# Patient Record
Sex: FEMALE | Race: OTHER | HISPANIC OR LATINO
[De-identification: names, ages, dates, MRNs, and addresses within clinical notes are randomized per-mention and may not be internally consistent; named-entity substitution may affect disease eponyms.]

---

## 2017-06-01 ENCOUNTER — RESULT REVIEW (OUTPATIENT)
Age: 42
End: 2017-06-01

## 2017-06-01 ENCOUNTER — APPOINTMENT (OUTPATIENT)
Dept: FAMILY MEDICINE | Facility: HOSPITAL | Age: 42
End: 2017-06-01

## 2017-06-01 ENCOUNTER — OUTPATIENT (OUTPATIENT)
Dept: OUTPATIENT SERVICES | Facility: HOSPITAL | Age: 42
LOS: 1 days | End: 2017-06-01
Payer: SELF-PAY

## 2017-06-01 VITALS
HEIGHT: 60 IN | TEMPERATURE: 97.8 F | DIASTOLIC BLOOD PRESSURE: 87 MMHG | RESPIRATION RATE: 14 BRPM | BODY MASS INDEX: 29.06 KG/M2 | OXYGEN SATURATION: 100 % | WEIGHT: 148 LBS | SYSTOLIC BLOOD PRESSURE: 129 MMHG | HEART RATE: 77 BPM

## 2017-06-01 PROCEDURE — 88175 CYTOPATH C/V AUTO FLUID REDO: CPT

## 2017-06-01 PROCEDURE — G0463: CPT

## 2017-06-01 PROCEDURE — 87624 HPV HI-RISK TYP POOLED RSLT: CPT

## 2017-06-23 LAB
CYTOLOGY CVX/VAG DOC THIN PREP: NORMAL
HPV I/H RISK 3 DNA ANAL QL PROBE+SIG AMP: NORMAL

## 2019-03-13 ENCOUNTER — OUTPATIENT (OUTPATIENT)
Dept: OUTPATIENT SERVICES | Facility: HOSPITAL | Age: 44
LOS: 1 days | End: 2019-03-13
Payer: SELF-PAY

## 2019-03-13 ENCOUNTER — APPOINTMENT (OUTPATIENT)
Age: 44
End: 2019-03-13

## 2019-03-13 VITALS
WEIGHT: 148 LBS | OXYGEN SATURATION: 98 % | RESPIRATION RATE: 14 BRPM | SYSTOLIC BLOOD PRESSURE: 116 MMHG | BODY MASS INDEX: 28.9 KG/M2 | DIASTOLIC BLOOD PRESSURE: 82 MMHG | HEART RATE: 72 BPM | TEMPERATURE: 97.7 F

## 2019-03-13 DIAGNOSIS — Z82.49 FAMILY HISTORY OF ISCHEMIC HEART DISEASE AND OTHER DISEASES OF THE CIRCULATORY SYSTEM: ICD-10-CM

## 2019-03-13 DIAGNOSIS — Z00.00 ENCOUNTER FOR GENERAL ADULT MEDICAL EXAMINATION WITHOUT ABNORMAL FINDINGS: ICD-10-CM

## 2019-03-13 PROCEDURE — 80053 COMPREHEN METABOLIC PANEL: CPT

## 2019-03-13 PROCEDURE — G0463: CPT

## 2019-03-13 PROCEDURE — 83036 HEMOGLOBIN GLYCOSYLATED A1C: CPT

## 2019-03-13 PROCEDURE — 90471 IMMUNIZATION ADMIN: CPT

## 2019-03-13 PROCEDURE — 36415 COLL VENOUS BLD VENIPUNCTURE: CPT

## 2019-03-13 PROCEDURE — 87389 HIV-1 AG W/HIV-1&-2 AB AG IA: CPT

## 2019-03-13 NOTE — COUNSELING
[Weight management counseling provided] : Weight management [Healthy eating counseling provided] : healthy eating [Activity counseling provided] : activity [Low Fat Diet] : Low fat diet [Decrease Portions] : Decrease food portions [Keep Food Diary] : Keep food diary [___ min/wk activity recommended] : [unfilled] min/wk activity recommended [Walking] : Walking

## 2019-03-14 DIAGNOSIS — R73.09 OTHER ABNORMAL GLUCOSE: ICD-10-CM

## 2019-03-14 DIAGNOSIS — Z11.4 ENCOUNTER FOR SCREENING FOR HUMAN IMMUNODEFICIENCY VIRUS [HIV]: ICD-10-CM

## 2019-03-14 DIAGNOSIS — E66.3 OVERWEIGHT: ICD-10-CM

## 2019-03-14 DIAGNOSIS — Z23 ENCOUNTER FOR IMMUNIZATION: ICD-10-CM

## 2019-03-14 LAB
ALBUMIN SERPL ELPH-MCNC: 4.4 G/DL
ALP BLD-CCNC: 87 U/L
ALT SERPL-CCNC: 14 U/L
ANION GAP SERPL CALC-SCNC: 11 MMOL/L
AST SERPL-CCNC: 15 U/L
BILIRUB SERPL-MCNC: <0.2 MG/DL
BUN SERPL-MCNC: 13 MG/DL
CALCIUM SERPL-MCNC: 10 MG/DL
CHLORIDE SERPL-SCNC: 105 MMOL/L
CO2 SERPL-SCNC: 25 MMOL/L
CREAT SERPL-MCNC: 0.7 MG/DL
GLUCOSE SERPL-MCNC: 96 MG/DL
HBA1C MFR BLD HPLC: 5.6 %
HIV1+2 AB SPEC QL IA.RAPID: NONREACTIVE
POTASSIUM SERPL-SCNC: 4.5 MMOL/L
PROT SERPL-MCNC: 7.5 G/DL
SODIUM SERPL-SCNC: 141 MMOL/L

## 2019-03-14 NOTE — PHYSICAL EXAM
[No Acute Distress] : no acute distress [Well Nourished] : well nourished [Well Developed] : well developed [Well-Appearing] : well-appearing [Normal Sclera/Conjunctiva] : normal sclera/conjunctiva [PERRL] : pupils equal round and reactive to light [EOMI] : extraocular movements intact [Normal Outer Ear/Nose] : the outer ears and nose were normal in appearance [No JVD] : no jugular venous distention [Supple] : supple [No Lymphadenopathy] : no lymphadenopathy [Thyroid Normal, No Nodules] : the thyroid was normal and there were no nodules present [No Respiratory Distress] : no respiratory distress  [Clear to Auscultation] : lungs were clear to auscultation bilaterally [No Accessory Muscle Use] : no accessory muscle use [Normal Rate] : normal rate  [Regular Rhythm] : with a regular rhythm [Normal S1, S2] : normal S1 and S2 [No Murmur] : no murmur heard [Pedal Pulses Present] : the pedal pulses are present [No Edema] : there was no peripheral edema [No Extremity Clubbing/Cyanosis] : no extremity clubbing/cyanosis [No Palpable Aorta] : no palpable aorta [Soft] : abdomen soft [Non-distended] : non-distended [No Masses] : no abdominal mass palpated [No HSM] : no HSM [Normal Bowel Sounds] : normal bowel sounds [Normal Posterior Cervical Nodes] : no posterior cervical lymphadenopathy [Normal Anterior Cervical Nodes] : no anterior cervical lymphadenopathy [No CVA Tenderness] : no CVA  tenderness [No Spinal Tenderness] : no spinal tenderness [No Joint Swelling] : no joint swelling [Grossly Normal Strength/Tone] : grossly normal strength/tone [No Rash] : no rash [Normal Gait] : normal gait [Coordination Grossly Intact] : coordination grossly intact [No Focal Deficits] : no focal deficits [Deep Tendon Reflexes (DTR)] : deep tendon reflexes were 2+ and symmetric [Normal Affect] : the affect was normal [Normal Insight/Judgement] : insight and judgment were intact [Normal TMs] : both tympanic membranes were normal [Normal Nasal Mucosa] : the nasal mucosa was normal [de-identified] : metallic plates on teeth [de-identified] : tenderness to palpation of LUQ/LLQ

## 2019-03-14 NOTE — HISTORY OF PRESENT ILLNESS
[[employer] ] : provided by [employer]   [FreeTextEntry1] : [id number] [FreeTextEntry2] : Mauro [de-identified] : 44 yo F here for CPE. LMP 3/7/19. She describes having lower abdominal/pelvic pain with her periods, for which she takes Advil for. Pt notes that at times she experiences this pain in between cycles. Pt also mentions  that she has had some intermenstrual bleeding recently.\par \par FAMILY Hx: Mother has some history of heart disease but no history of MI (as previously documented).\par \par IMM: Flu and Tdap vaccines offered. Pt refused flu vaccine but would like Tdap today.\par SCREENINGS: Last Pap smear in 2017 was unsatisfactory/- HPV. Pt has not had repeat or follow up Pap since. HIV screening test offered, pt accepts. \par ORAL HEALTH: Pt has not seen a dentist within the past year.

## 2019-03-14 NOTE — REVIEW OF SYSTEMS
[Abdominal Pain] : abdominal pain [Negative] : Heme/Lymph [Fever] : no fever [Chills] : no chills [Night Sweats] : no night sweats [Chest Pain] : no chest pain [Palpitations] : no palpitations [Shortness Of Breath] : no shortness of breath [Cough] : no cough [Nausea] : no nausea [Vomiting] : no vomiting [Dysuria] : no dysuria [Hematuria] : no hematuria [Headache] : no headache [Dizziness] : no dizziness [Unsteady Walking] : no ataxia [FreeTextEntry7] : lower abdominal pain [FreeTextEntry8] : intermenstrual bleeding [FreeTextEntry9] : right hip pain when standing

## 2019-03-14 NOTE — ASSESSMENT
[FreeTextEntry1] : 42 yo overweight F here for CPE. ROS is positive for pelvic pain/intermenstrual bleeding. Pt needs repeat Pap as last Pap in 2017 was unsatisfactory. \par \par #CPE\par #Health Maintenance\par #Overweight\par - IMM: Tdap vaccine given today. Flu vaccine offered, pt refused\par - LABS: A1c, CMP, HIV screen\par - Increased physical activity and dietary changes recommended\par - CONSULTS: Dental referral for routine dental check\par - RTC in 1-4 weeks for repeat Pap/well woman exam \par \par #Intermenstrual spotting\par #Dysmenorrhea\par - Maintain menstrual diary/card\par - Try Tylenol Extra strength for pain. Use NSAID only if pain unresolved with Tylenol\par - Will assess during well woman visit\par \par Pt discussed with Dr. Franks\par \par

## 2019-03-14 NOTE — HEALTH RISK ASSESSMENT
[No falls in past year] : Patient reported no falls in the past year [HIV Test offered] : HIV Test offered [Alone] : lives alone [Employed] : employed [Single] : single [Fully functional (bathing, dressing, toileting, transferring, walking, feeding)] : Fully functional (bathing, dressing, toileting, transferring, walking, feeding) [Fully functional (using the telephone, shopping, preparing meals, housekeeping, doing laundry, using] : Fully functional and needs no help or supervision to perform IADLs (using the telephone, shopping, preparing meals, housekeeping, doing laundry, using transportation, managing medications and managing finances) [] : No [Reports changes in hearing] : Reports no changes in hearing [Reports changes in vision] : Reports no changes in vision [PapSmearDate] : 06/17 [PapSmearComments] : unsatisfactory Pap/ HPV Negative [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

## 2019-03-23 ENCOUNTER — OUTPATIENT (OUTPATIENT)
Dept: OUTPATIENT SERVICES | Facility: HOSPITAL | Age: 44
LOS: 1 days | End: 2019-03-23
Payer: SELF-PAY

## 2019-03-23 ENCOUNTER — APPOINTMENT (OUTPATIENT)
Age: 44
End: 2019-03-23

## 2019-03-23 VITALS
BODY MASS INDEX: 29.06 KG/M2 | DIASTOLIC BLOOD PRESSURE: 74 MMHG | OXYGEN SATURATION: 100 % | SYSTOLIC BLOOD PRESSURE: 107 MMHG | RESPIRATION RATE: 14 BRPM | HEIGHT: 60 IN | TEMPERATURE: 97.8 F | HEART RATE: 72 BPM | WEIGHT: 148 LBS

## 2019-03-23 DIAGNOSIS — Z11.4 ENCOUNTER FOR SCREENING FOR HUMAN IMMUNODEFICIENCY VIRUS [HIV]: ICD-10-CM

## 2019-03-23 DIAGNOSIS — Z23 ENCOUNTER FOR IMMUNIZATION: ICD-10-CM

## 2019-03-23 DIAGNOSIS — Z00.00 ENCOUNTER FOR GENERAL ADULT MEDICAL EXAMINATION WITHOUT ABNORMAL FINDINGS: ICD-10-CM

## 2019-03-23 PROCEDURE — G0463: CPT

## 2019-03-23 PROCEDURE — 87624 HPV HI-RISK TYP POOLED RSLT: CPT

## 2019-03-23 NOTE — PHYSICAL EXAM
[Awake] : awake [Alert] : alert [No Lesions] : no genitalia lesions [Normal] : uterus [Moderate] : there was moderate vaginal bleeding [Pap Obtained] : a Pap smear was performed [Normal Position] : in a normal position [Acute Distress] : no acute distress [Discharge] : no discharge [FreeTextEntry5] : mild bleeding from os

## 2019-03-23 NOTE — HISTORY OF PRESENT ILLNESS
[Reproductive Age] : is of reproductive age [Menstrual Problems] : reports abnormal menses [Definite ___ (Date)] : the last menstrual period was [unfilled]

## 2019-03-23 NOTE — DISCUSSION/SUMMARY
[FreeTextEntry1] : 42 yo F with history of unsatisfactory Pap in 2017 here for well woman exam. ROS positive for dysmenorrhea and intermenstrual spotting.\par \par #Well Woman Exam \par - Last Pap in 2017 was unsatisfactory/ -HPV\par - LABS: Pap smear with HPV co testing\par \par #Dysmenorrhea\par - Continue to take ibuprofen PRN\par \par #Intermenstrual spotting\par - New menstrual card given for patient to keep track of bleeding\par - Will check CBC at next visit\par - RTC in 1 month for follow-up\par - Pt advised to seek medical attention sooner if bleeding intensifies or  to to go to ED if she develops chest pain, shortness of breath, palpitations or dizziness.\par \par Discussed with Dr. Scanlon

## 2019-03-23 NOTE — REVIEW OF SYSTEMS
[Pelvic Pain] : pelvic pain [Abn Vag Bleeding] : abnormal vaginal bleeding [Vaginal Bleeding] : vaginal bleeding [Painful Periods] : painful periods [Nl] : Integumentary [Fever] : no fever [Chills] : no chills [Feeling Tired] : not feeling tired [Heart Rate Is Fast] : the heart rate was not fast [Chest Pain] : no chest pain [Palpitations] : no palpitations [Dyspnea] : no shortness of breath [Cough] : no cough [SOB on Exertion] : no shortness of breath during exertion [Dysuria] : no dysuria [Frequency] : no frequency [Urgency] : no urgency

## 2019-03-23 NOTE — CHIEF COMPLAINT
[Annual Visit] : annual visit [FreeTextEntry1] : Annual GYN exam. Repeat Pap. +Intermenstrual spotting

## 2019-03-25 DIAGNOSIS — N92.3 OVULATION BLEEDING: ICD-10-CM

## 2019-03-25 DIAGNOSIS — Z01.419 ENCOUNTER FOR GYNECOLOGICAL EXAMINATION (GENERAL) (ROUTINE) WITHOUT ABNORMAL FINDINGS: ICD-10-CM

## 2019-04-27 ENCOUNTER — OUTPATIENT (OUTPATIENT)
Dept: OUTPATIENT SERVICES | Facility: HOSPITAL | Age: 44
LOS: 1 days | End: 2019-04-27
Payer: SELF-PAY

## 2019-04-27 ENCOUNTER — APPOINTMENT (OUTPATIENT)
Age: 44
End: 2019-04-27

## 2019-04-27 VITALS
HEIGHT: 60 IN | RESPIRATION RATE: 14 BRPM | WEIGHT: 146 LBS | HEART RATE: 67 BPM | DIASTOLIC BLOOD PRESSURE: 80 MMHG | BODY MASS INDEX: 28.66 KG/M2 | OXYGEN SATURATION: 100 % | SYSTOLIC BLOOD PRESSURE: 122 MMHG | TEMPERATURE: 97.6 F

## 2019-04-27 DIAGNOSIS — Z00.00 ENCOUNTER FOR GENERAL ADULT MEDICAL EXAMINATION WITHOUT ABNORMAL FINDINGS: ICD-10-CM

## 2019-04-27 PROCEDURE — 87624 HPV HI-RISK TYP POOLED RSLT: CPT

## 2019-04-27 PROCEDURE — G0463: CPT

## 2019-04-29 DIAGNOSIS — R87.625 UNSATISFACTORY CYTOLOGIC SMEAR OF VAGINA: ICD-10-CM

## 2019-04-29 DIAGNOSIS — J06.9 ACUTE UPPER RESPIRATORY INFECTION, UNSPECIFIED: ICD-10-CM

## 2019-04-29 NOTE — HISTORY OF PRESENT ILLNESS
[[employer] ] : provided by [employer]   [FreeTextEntry1] : [id number] [FreeTextEntry2] : Nelsy [de-identified] : 44 yo F with Hx of multiple unsatisfactory Pap smears is  here for repeat Pap. Pt presents her menstrual card for review  LMP 4/24/19. She had mild spotting 2 days prior to onset of menses. She continues to  have lower abdominal/pelvic pain with her periods, for which she takes Advil for. She also notes that she has had some URI sxs of sore throat, cough productive of yellowish sputum, mild muscle aches, nasal congestion and subjective fever since last Friday. She describes mild headache. Pt denies any nausea, vomiting, diarrhea or skin rash.\par \par

## 2019-04-29 NOTE — PHYSICAL EXAM
[No Acute Distress] : no acute distress [Well Nourished] : well nourished [Well Developed] : well developed [Well-Appearing] : well-appearing [Normal Sclera/Conjunctiva] : normal sclera/conjunctiva [PERRL] : pupils equal round and reactive to light [EOMI] : extraocular movements intact [Normal Outer Ear/Nose] : the outer ears and nose were normal in appearance [Normal TMs] : both tympanic membranes were normal [Normal Nasal Mucosa] : the nasal mucosa was normal [No JVD] : no jugular venous distention [Supple] : supple [No Lymphadenopathy] : no lymphadenopathy [Thyroid Normal, No Nodules] : the thyroid was normal and there were no nodules present [No Respiratory Distress] : no respiratory distress  [Clear to Auscultation] : lungs were clear to auscultation bilaterally [No Accessory Muscle Use] : no accessory muscle use [Normal Rate] : normal rate  [Regular Rhythm] : with a regular rhythm [Normal S1, S2] : normal S1 and S2 [No Murmur] : no murmur heard [Pedal Pulses Present] : the pedal pulses are present [No Edema] : there was no peripheral edema [No Extremity Clubbing/Cyanosis] : no extremity clubbing/cyanosis [No Palpable Aorta] : no palpable aorta [Soft] : abdomen soft [Non-distended] : non-distended [No Masses] : no abdominal mass palpated [No HSM] : no HSM [Normal Bowel Sounds] : normal bowel sounds [Normal Posterior Cervical Nodes] : no posterior cervical lymphadenopathy [Normal Anterior Cervical Nodes] : no anterior cervical lymphadenopathy [No CVA Tenderness] : no CVA  tenderness [No Spinal Tenderness] : no spinal tenderness [No Joint Swelling] : no joint swelling [Grossly Normal Strength/Tone] : grossly normal strength/tone [No Rash] : no rash [Normal Gait] : normal gait [Coordination Grossly Intact] : coordination grossly intact [No Focal Deficits] : no focal deficits [Deep Tendon Reflexes (DTR)] : deep tendon reflexes were 2+ and symmetric [Normal Affect] : the affect was normal [Normal Insight/Judgement] : insight and judgment were intact [Urethral Meatus] : normal urethra [Urinary Bladder Findings] : the bladder was normal on palpation [External Female Genitalia] : normal external genitalia [Cervix] : normal cervix [Uterus] : uterus was normal size, without masses or tenderness [Uterine Adnexae] : normal adnexa [Moderate] : moderate bleeding [Atrophy] : no atrophy [Discharge] : no discharge [Erythematous] : no erythema [Dry Mucosa] : a moist mucosa [de-identified] : metallic plates on teeth [de-identified] : tenderness to palpation of LUQ/LLQ

## 2019-04-29 NOTE — REVIEW OF SYSTEMS
[Chills] : chills [Nasal Discharge] : nasal discharge [Sore Throat] : sore throat [Postnasal Drip] : postnasal drip [Cough] : cough [Abdominal Pain] : abdominal pain [Negative] : Heme/Lymph [Fever] : no fever [Night Sweats] : no night sweats [Chest Pain] : no chest pain [Palpitations] : no palpitations [Shortness Of Breath] : no shortness of breath [Nausea] : no nausea [Vomiting] : no vomiting [Dysuria] : no dysuria [Hematuria] : no hematuria [Headache] : no headache [Dizziness] : no dizziness [Unsteady Walking] : no ataxia [FreeTextEntry4] : nasal congestion [FreeTextEntry7] : lower abdominal pain [FreeTextEntry8] : intermenstrual bleeding [FreeTextEntry9] : right hip pain when standing

## 2019-04-29 NOTE — ASSESSMENT
[FreeTextEntry1] : 42 yo here for follow-up of pelvic pain/intermenstrual bleeding. Pt needs repeat Pap as last Pap in 2017 was unsatisfactory and previous Pap unsatisfactory. Hxo f ASCUS in Nov 2016. ROS and physical exam c/w acute URI/sinusitis, likely viral in nature.\par \par \par #Intermenstrual spotting\par #Dysmenorrhea\par #Unsatisfactory Pap\par #Hx of ASCUS\par - LABS: Repeat Pap/HPV co testing performed today\par - Maintain menstrual diary/card\par - Continue ibuprofen prn pain\par - RTC in 2-4 weeks for follow up\par \par #Acute URI\par #Nasal sinus congestion\par - Likely viral in nature\par - Pt counseled on self-limiting course\par - Increase PO hydration\par - MEDS: Saline mist/Flonase spray\par - RTC in 2 weeks for follow up\par - Pt advised to return sooner if sxs worsen\par \par Pt discussed with Dr. Franks\par \par

## 2019-06-01 ENCOUNTER — APPOINTMENT (OUTPATIENT)
Age: 44
End: 2019-06-01

## 2019-06-06 LAB
CYTOLOGY CVX/VAG DOC THIN PREP: NORMAL
HPV HIGH+LOW RISK DNA PNL CVX: NOT DETECTED
HPV HIGH+LOW RISK DNA PNL CVX: NOT DETECTED

## 2019-09-19 LAB — CYTOLOGY CVX/VAG DOC THIN PREP: NORMAL

## 2019-10-11 ENCOUNTER — APPOINTMENT (OUTPATIENT)
Dept: FAMILY MEDICINE | Facility: HOSPITAL | Age: 44
End: 2019-10-11

## 2020-02-22 ENCOUNTER — APPOINTMENT (OUTPATIENT)
Dept: FAMILY MEDICINE | Facility: HOSPITAL | Age: 45
End: 2020-02-22

## 2020-02-22 ENCOUNTER — OUTPATIENT (OUTPATIENT)
Dept: OUTPATIENT SERVICES | Facility: HOSPITAL | Age: 45
LOS: 1 days | End: 2020-02-22
Payer: SELF-PAY

## 2020-02-22 VITALS
SYSTOLIC BLOOD PRESSURE: 113 MMHG | BODY MASS INDEX: 28.12 KG/M2 | OXYGEN SATURATION: 100 % | DIASTOLIC BLOOD PRESSURE: 79 MMHG | TEMPERATURE: 97.8 F | HEART RATE: 79 BPM | RESPIRATION RATE: 14 BRPM | WEIGHT: 144 LBS

## 2020-02-22 DIAGNOSIS — Z87.09 PERSONAL HISTORY OF OTHER DISEASES OF THE RESPIRATORY SYSTEM: ICD-10-CM

## 2020-02-22 DIAGNOSIS — Z00.00 ENCOUNTER FOR GENERAL ADULT MEDICAL EXAMINATION WITHOUT ABNORMAL FINDINGS: ICD-10-CM

## 2020-02-22 PROCEDURE — G0463: CPT

## 2020-02-24 DIAGNOSIS — J06.9 ACUTE UPPER RESPIRATORY INFECTION, UNSPECIFIED: ICD-10-CM

## 2020-02-28 ENCOUNTER — APPOINTMENT (OUTPATIENT)
Dept: FAMILY MEDICINE | Facility: HOSPITAL | Age: 45
End: 2020-02-28

## 2020-02-28 NOTE — ASSESSMENT
[FreeTextEntry1] : 44F who is here for a pap smear\par \par Pap smear needed\par -core lab and hpv done \par \par rtc in 2 weeks for CPE

## 2020-02-28 NOTE — HISTORY OF PRESENT ILLNESS
[FreeTextEntry8] : 44F w/ no significant pmhx with previous unsatisfactory pap smear who is here for a repeat pap smear. Pt has complaint of

## 2020-03-24 NOTE — ASSESSMENT
[FreeTextEntry1] : 44F w/o significant PMHx presenting for sore throat and subjective fever x 1 week.

## 2020-03-24 NOTE — PLAN
[FreeTextEntry1] : #Acute URI likely viral\par - unlikely bacterial \par - discussed supportive care: ibuprofen, liquids, rest\par \par #HCM\par - flu vaccine deferred \par - screenings up to date\par - Due for CPE in March\par \par RTC in 1 week for not improving \par d/w Dr. Floyd

## 2020-03-24 NOTE — HISTORY OF PRESENT ILLNESS
[FreeTextEntry8] : [place]  [id number]. \par \par 44F w/o significant PMHx presenting for sore throat and subjective fever x 1 week. Associated with cough and body aches. Started as sore throat and then cough and then fevers. Also reports frontal and sinus pain. Eating well, but not drinking too well. No sick contacts. Denies any recent travel.

## 2020-03-24 NOTE — PHYSICAL EXAM
[No Lymphadenopathy] : no lymphadenopathy [Normal] : no respiratory distress, lungs were clear to auscultation bilaterally and no accessory muscle use [de-identified] : +pharyngeal edema and mild tonsillar hypertrophy, no exudates seen  [de-identified] : w

## 2020-03-24 NOTE — REVIEW OF SYSTEMS
[Chills] : chills [Fever] : fever [Sore Throat] : sore throat [Cough] : cough [Headache] : headache [Chest Pain] : no chest pain [Palpitations] : no palpitations [Shortness Of Breath] : no shortness of breath [FreeTextEntry4] : +sinus pressure

## 2020-10-31 ENCOUNTER — MED ADMIN CHARGE (OUTPATIENT)
Age: 45
End: 2020-10-31

## 2020-10-31 ENCOUNTER — APPOINTMENT (OUTPATIENT)
Dept: FAMILY MEDICINE | Facility: HOSPITAL | Age: 45
End: 2020-10-31

## 2020-10-31 ENCOUNTER — OUTPATIENT (OUTPATIENT)
Dept: OUTPATIENT SERVICES | Facility: HOSPITAL | Age: 45
LOS: 1 days | End: 2020-10-31
Payer: SELF-PAY

## 2020-10-31 VITALS
HEIGHT: 60 IN | HEART RATE: 76 BPM | TEMPERATURE: 97.5 F | BODY MASS INDEX: 30.43 KG/M2 | RESPIRATION RATE: 14 BRPM | DIASTOLIC BLOOD PRESSURE: 80 MMHG | SYSTOLIC BLOOD PRESSURE: 120 MMHG | OXYGEN SATURATION: 99 % | WEIGHT: 155 LBS

## 2020-10-31 DIAGNOSIS — Z00.00 ENCOUNTER FOR GENERAL ADULT MEDICAL EXAMINATION WITHOUT ABNORMAL FINDINGS: ICD-10-CM

## 2020-10-31 DIAGNOSIS — J06.9 ACUTE UPPER RESPIRATORY INFECTION, UNSPECIFIED: ICD-10-CM

## 2020-10-31 DIAGNOSIS — Z01.419 ENCOUNTER FOR GYNECOLOGICAL EXAMINATION (GENERAL) (ROUTINE) W/OUT ABNORMAL FINDINGS: ICD-10-CM

## 2020-10-31 DIAGNOSIS — R87.620 ATYPICAL SQUAMOUS CELLS OF UNDETERMINED SIGNIFICANCE ON CYTOLOGIC SMEAR OF VAGINA (ASC-US): ICD-10-CM

## 2020-10-31 DIAGNOSIS — Z28.21 IMMUNIZATION NOT CARRIED OUT BECAUSE OF PATIENT REFUSAL: ICD-10-CM

## 2020-10-31 PROCEDURE — G0463: CPT

## 2020-11-01 DIAGNOSIS — M25.511 PAIN IN RIGHT SHOULDER: ICD-10-CM

## 2020-11-06 PROBLEM — Z28.21 REFUSED INFLUENZA VACCINE: Status: RESOLVED | Noted: 2019-03-13 | Resolved: 2020-11-06

## 2020-11-06 PROBLEM — R87.620 PAP SMEAR OF VAGINA WITH ASC-US: Status: RESOLVED | Noted: 2017-06-01 | Resolved: 2020-11-06

## 2020-11-06 PROBLEM — J06.9 ACUTE URI: Status: RESOLVED | Noted: 2019-04-27 | Resolved: 2020-11-06

## 2020-11-06 RX ORDER — SODIUM CHLORIDE 0.65 %
0.65 AEROSOL, SPRAY (ML) NASAL TWICE DAILY
Qty: 1 | Refills: 0 | Status: COMPLETED | COMMUNITY
Start: 2019-04-27 | End: 2020-10-31

## 2020-11-06 RX ORDER — IBUPROFEN 400 MG/1
400 TABLET ORAL EVERY 8 HOURS
Qty: 90 | Refills: 0 | Status: COMPLETED | COMMUNITY
Start: 2019-03-13 | End: 2020-10-31

## 2020-11-06 NOTE — REVIEW OF SYSTEMS
[Fever] : no fever [Chills] : no chills [Chest Pain] : no chest pain [Palpitations] : no palpitations [Shortness Of Breath] : no shortness of breath [Cough] : no cough [Abdominal Pain] : no abdominal pain [Nausea] : no nausea [Vomiting] : no vomiting [Joint Swelling] : no joint swelling [Muscle Weakness] : no muscle weakness [Muscle Pain] : muscle pain [Back Pain] : back pain [Headache] : no headache [Dizziness] : no dizziness [Unsteady Walking] : no ataxia [FreeTextEntry9] : +R shoulder, neck, R leg pain

## 2020-11-06 NOTE — ASSESSMENT
[FreeTextEntry1] : 46 y/o F w/ no significant PMHx presenting with 1 wk h/o of R shoulder pain and R leg pain Liliam Mcmahon

## 2020-11-06 NOTE — PHYSICAL EXAM
[No Acute Distress] : no acute distress [Well-Appearing] : well-appearing [No Respiratory Distress] : no respiratory distress  [Clear to Auscultation] : lungs were clear to auscultation bilaterally [Normal Rate] : normal rate  [Regular Rhythm] : with a regular rhythm [Normal S1, S2] : normal S1 and S2 [Soft] : abdomen soft [Non Tender] : non-tender [Non-distended] : non-distended [No Masses] : no abdominal mass palpated [No HSM] : no HSM [Normal Bowel Sounds] : normal bowel sounds [No Spinal Tenderness] : no spinal tenderness [Left Paraspinal ___ (level)] : ~Ulevel [unfilled] left paraspinal [Right Paraspinal ___ (level)] : ~Ulevel [unfilled] right paraspinal [Muscular] : muscular [Paraspinal] : paraspinal [4] : C4 [5] : C5 [6] : C6 [RIght Trapezius Muscle] : right trapezius muscle [Full Except as Noted] : Full except as noted: [Pain] : was painful [Normal] : Normal [Spurling's Maneuver] : negative Spurling's Maneuver [No Visual Abnormalities] : no visible abnormalities [No Tenderness to Palpation] : no spine tenderness on palpation [Full ROM] : full ROM [No Pain with ROM] : no pain with motion in any direction [Intact] : all reflexes within normal limits bilaterally [No Joint Swelling] : no joint swelling [Grossly Normal Strength/Tone] : grossly normal strength/tone [Coordination Grossly Intact] : coordination grossly intact [Normal Gait] : normal gait [FreeTextEntry3] : Neck pain reproduced with spurling maneuver w/o radiation down arm [de-identified] : Straight leg test on R negative, reproduction of soreness of thigh

## 2020-11-06 NOTE — HISTORY OF PRESENT ILLNESS
[FreeTextEntry8] : 46 y/o F w/ no significant PMHx presenting with 1 wk h/o of R shoulder pain. Notes pain can reach 9/10 and occurs with movement and sometimes radiates down R arm.  Pt is constant and notes occasional numbness and tingling. Moving head left to right reproduces pain in mid scapular area. Still able to work and use right arm, no noted change in strength. Took advil with some relief on the first day but nothing since then. Also notes some R leg soreness that radiates down thigh. No numbness or tingling. No saddle anesthesia or incontinence. Pt ambulating well. Denies any recent trauma that might have precipitated symptom onset. Pt denies taking any other medication at this time. Denies fever, chills, cough, HA, dizziness, CO, SOB, palpitations, abd pain.

## 2020-11-06 NOTE — PLAN
[FreeTextEntry1] : #Shoulder/Neck pain\par -Concern for cervical radiculopathy vs muscle spasm; Spurling maneuver negative\par -MRI C-spine ordered to evaluate for possible joint pathology or disc herniation\par -Aleve BID PRN pain for 5-7 days, can apply heat in morning, rest and cold application in evening\par -PT referral given to pt today for R upper and lower extremities\par -RTC in 2 wks, consider steroids if no improvement\par \par #HCM\par -flu shot given today\par \par # Need for Pap\par -Unsatisfactory sample during last 2 visits\par -Will schedule f/u in clinic for r/p Pap\par -HPV 4/2019 performed; not detected\par \par RTC in 2 weeks for symptom f/u and Pap

## 2020-11-07 ENCOUNTER — APPOINTMENT (OUTPATIENT)
Dept: FAMILY MEDICINE | Facility: HOSPITAL | Age: 45
End: 2020-11-07

## 2020-11-07 ENCOUNTER — OUTPATIENT (OUTPATIENT)
Dept: OUTPATIENT SERVICES | Facility: HOSPITAL | Age: 45
LOS: 1 days | End: 2020-11-07
Payer: SELF-PAY

## 2020-11-07 VITALS
OXYGEN SATURATION: 100 % | RESPIRATION RATE: 14 BRPM | WEIGHT: 156 LBS | TEMPERATURE: 96.9 F | SYSTOLIC BLOOD PRESSURE: 111 MMHG | BODY MASS INDEX: 30.47 KG/M2 | DIASTOLIC BLOOD PRESSURE: 72 MMHG | HEART RATE: 72 BPM

## 2020-11-07 DIAGNOSIS — Z00.00 ENCOUNTER FOR GENERAL ADULT MEDICAL EXAMINATION WITHOUT ABNORMAL FINDINGS: ICD-10-CM

## 2020-11-07 DIAGNOSIS — N76.0 ACUTE VAGINITIS: ICD-10-CM

## 2020-11-07 DIAGNOSIS — B96.89 ACUTE VAGINITIS: ICD-10-CM

## 2020-11-07 PROCEDURE — G0463: CPT

## 2020-11-07 PROCEDURE — 87624 HPV HI-RISK TYP POOLED RSLT: CPT

## 2020-11-07 PROCEDURE — 87800 DETECT AGNT MULT DNA DIREC: CPT

## 2020-11-07 RX ORDER — FLUTICASONE PROPIONATE 50 UG/1
50 SPRAY, METERED NASAL DAILY
Qty: 1 | Refills: 0 | Status: COMPLETED | COMMUNITY
Start: 2019-04-27 | End: 2020-11-07

## 2020-11-07 NOTE — REVIEW OF SYSTEMS
[Abdominal Pain] : abdominal pain [Constipation] : constipation [Negative] : Heme/Lymph [Nausea] : no nausea [Diarrhea] : diarrhea [Vomiting] : no vomiting [Heartburn] : no heartburn [Melena] : no melena

## 2020-11-07 NOTE — PHYSICAL EXAM
[No Acute Distress] : no acute distress [No Respiratory Distress] : no respiratory distress  [Clear to Auscultation] : lungs were clear to auscultation bilaterally [Normal Rate] : normal rate  [Regular Rhythm] : with a regular rhythm [Normal S1, S2] : normal S1 and S2 [No Murmur] : no murmur heard [Soft] : abdomen soft [Non-distended] : non-distended [No Masses] : no abdominal mass palpated [No HSM] : no HSM [Normal Bowel Sounds] : normal bowel sounds [Urethral Meatus] : normal urethra [Urinary Bladder Findings] : the bladder was normal on palpation [External Female Genitalia] : normal external genitalia [Vagina] : normal vaginal exam [Uterus] : uterus was normal size, without masses or tenderness [Uterine Adnexae] : normal adnexa [Anus Abnormality] : the anus and perineum were normal [Normal Affect] : the affect was normal [Alert and Oriented x3] : oriented to person, place, and time [Normal Mood] : the mood was normal [de-identified] : mildly tender to palpation to LLQ, no rebound or guarding  [FreeTextEntry1] : Cervix short, erythematous, bleeds easily, white discharge noted in vaginal vault

## 2020-11-07 NOTE — HISTORY OF PRESENT ILLNESS
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 HPV negative. Patient states that she has some relief of pain with naproxen. Will schedule PT and MRI of cervical spine ordered at past visit. Patient also c/o LLQ abdominal pain for ~ 1 month now, intermittent, of ~ 5/10, non radiating, no accompanying symptoms, minimally relieved with tylenol. Patient also reports constipation. Patient denies fever, chills, headaches, dizziness, chest pain, shortness of breath, cough, palpitations, nausea, vomiting, diarrhea, abdominal pain, leg pain, leg edema, or weakness.\par \par LMP 10/19/2020

## 2020-11-09 DIAGNOSIS — R10.32 LEFT LOWER QUADRANT PAIN: ICD-10-CM

## 2020-11-09 DIAGNOSIS — Z12.4 ENCOUNTER FOR SCREENING FOR MALIGNANT NEOPLASM OF CERVIX: ICD-10-CM

## 2020-11-10 LAB
C TRACH RRNA SPEC QL NAA+PROBE: NOT DETECTED
CANDIDA VAG CYTO: NOT DETECTED
G VAGINALIS+PREV SP MTYP VAG QL MICRO: DETECTED
N GONORRHOEA RRNA SPEC QL NAA+PROBE: NOT DETECTED
SOURCE AMPLIFICATION: NORMAL
T VAGINALIS VAG QL WET PREP: NOT DETECTED

## 2020-11-11 ENCOUNTER — NON-APPOINTMENT (OUTPATIENT)
Age: 45
End: 2020-11-11

## 2020-11-14 ENCOUNTER — NON-APPOINTMENT (OUTPATIENT)
Age: 45
End: 2020-11-14

## 2020-11-14 LAB
CYTOLOGY CVX/VAG DOC THIN PREP: ABNORMAL
HPV HIGH+LOW RISK DNA PNL CVX: NOT DETECTED

## 2020-12-04 ENCOUNTER — OUTPATIENT (OUTPATIENT)
Dept: OUTPATIENT SERVICES | Facility: HOSPITAL | Age: 45
LOS: 1 days | End: 2020-12-04
Payer: SELF-PAY

## 2020-12-04 ENCOUNTER — APPOINTMENT (OUTPATIENT)
Dept: FAMILY MEDICINE | Facility: HOSPITAL | Age: 45
End: 2020-12-04

## 2020-12-04 VITALS
TEMPERATURE: 97 F | BODY MASS INDEX: 29.69 KG/M2 | RESPIRATION RATE: 14 BRPM | WEIGHT: 152 LBS | OXYGEN SATURATION: 95 % | HEART RATE: 72 BPM | DIASTOLIC BLOOD PRESSURE: 73 MMHG | SYSTOLIC BLOOD PRESSURE: 111 MMHG

## 2020-12-04 DIAGNOSIS — J30.9 ALLERGIC RHINITIS, UNSPECIFIED: ICD-10-CM

## 2020-12-04 DIAGNOSIS — Z00.00 ENCOUNTER FOR GENERAL ADULT MEDICAL EXAMINATION WITHOUT ABNORMAL FINDINGS: ICD-10-CM

## 2020-12-04 PROCEDURE — G0463: CPT

## 2020-12-08 DIAGNOSIS — J30.9 ALLERGIC RHINITIS, UNSPECIFIED: ICD-10-CM

## 2020-12-23 PROBLEM — N76.0 GARDNERELLA VAGINALIS INFECTION: Status: RESOLVED | Noted: 2020-11-10 | Resolved: 2020-12-23

## 2020-12-27 NOTE — END OF VISIT
[FreeTextEntry3] : Discussed with Dr Abel; mariahe; claritin, tylenol prn, agree with plan of care unless specified.

## 2020-12-27 NOTE — PLAN
[FreeTextEntry1] : #Allergic sinusitis\par -Start claritin 10m PO qhs\par -Flonase BID\par -Tylenol/NSAIDs PRN headaches\par \par #Insomnia\par -Proper sleep hygiene methods discussed\par -Will start melatonin 3mg PO qhs\par \par f/u in 1 month

## 2020-12-27 NOTE — REVIEW OF SYSTEMS
[Nasal Discharge] : nasal discharge [Postnasal Drip] : postnasal drip [Cough] : cough [Negative] : Heme/Lymph [Fever] : no fever [Chills] : no chills [Hot Flashes] : no hot flashes [Night Sweats] : no night sweats [Recent Change In Weight] : ~T no recent weight change [Earache] : no earache [Hearing Loss] : no hearing loss [Nosebleed] : no nosebleeds [Hoarseness] : no hoarseness [Sore Throat] : no sore throat

## 2020-12-27 NOTE — PHYSICAL EXAM
[No Acute Distress] : no acute distress [Well-Appearing] : well-appearing [Normal Sclera/Conjunctiva] : normal sclera/conjunctiva [PERRL] : pupils equal round and reactive to light [EOMI] : extraocular movements intact [Normal Outer Ear/Nose] : the outer ears and nose were normal in appearance [Normal Oropharynx] : the oropharynx was normal [No Lymphadenopathy] : no lymphadenopathy [No Respiratory Distress] : no respiratory distress  [Clear to Auscultation] : lungs were clear to auscultation bilaterally [Normal Rate] : normal rate  [Regular Rhythm] : with a regular rhythm [Normal S1, S2] : normal S1 and S2 [Soft] : abdomen soft [Non Tender] : non-tender [Non-distended] : non-distended [No HSM] : no HSM [Normal Bowel Sounds] : normal bowel sounds [Normal Affect] : the affect was normal [Normal Insight/Judgement] : insight and judgment were intact [de-identified] : mild erythema of b/l turbinates

## 2021-05-06 DIAGNOSIS — Z87.42 PERSONAL HISTORY OF OTHER DISEASES OF THE FEMALE GENITAL TRACT: ICD-10-CM

## 2021-05-06 DIAGNOSIS — Z98.890 OTHER SPECIFIED POSTPROCEDURAL STATES: ICD-10-CM

## 2021-05-06 DIAGNOSIS — R10.32 LEFT LOWER QUADRANT PAIN: ICD-10-CM

## 2021-05-06 DIAGNOSIS — R87.625 UNSATISFACTORY CYTOLOGIC SMEAR OF VAGINA: ICD-10-CM

## 2021-05-06 DIAGNOSIS — R73.09 OTHER ABNORMAL GLUCOSE: ICD-10-CM

## 2021-05-06 DIAGNOSIS — Z87.19 PERSONAL HISTORY OF OTHER DISEASES OF THE DIGESTIVE SYSTEM: ICD-10-CM

## 2021-05-06 RX ORDER — NAPROXEN 500 MG/1
500 TABLET ORAL
Qty: 14 | Refills: 0 | Status: COMPLETED | COMMUNITY
Start: 2020-10-31 | End: 2021-05-06

## 2021-05-07 ENCOUNTER — OUTPATIENT (OUTPATIENT)
Dept: OUTPATIENT SERVICES | Facility: HOSPITAL | Age: 46
LOS: 1 days | End: 2021-05-07
Payer: SELF-PAY

## 2021-05-07 ENCOUNTER — APPOINTMENT (OUTPATIENT)
Dept: FAMILY MEDICINE | Facility: HOSPITAL | Age: 46
End: 2021-05-07

## 2021-05-07 VITALS
BODY MASS INDEX: 30.08 KG/M2 | RESPIRATION RATE: 14 BRPM | DIASTOLIC BLOOD PRESSURE: 81 MMHG | HEART RATE: 73 BPM | OXYGEN SATURATION: 99 % | SYSTOLIC BLOOD PRESSURE: 118 MMHG | TEMPERATURE: 96.9 F | WEIGHT: 154 LBS

## 2021-05-07 DIAGNOSIS — Z87.898 PERSONAL HISTORY OF OTHER SPECIFIED CONDITIONS: ICD-10-CM

## 2021-05-07 DIAGNOSIS — M54.12 RADICULOPATHY, CERVICAL REGION: ICD-10-CM

## 2021-05-07 DIAGNOSIS — M54.5 LOW BACK PAIN: ICD-10-CM

## 2021-05-07 DIAGNOSIS — Z00.00 ENCOUNTER FOR GENERAL ADULT MEDICAL EXAMINATION WITHOUT ABNORMAL FINDINGS: ICD-10-CM

## 2021-05-07 PROCEDURE — G0463: CPT

## 2021-05-07 RX ORDER — METRONIDAZOLE 500 MG/1
500 TABLET ORAL TWICE DAILY
Qty: 14 | Refills: 0 | Status: COMPLETED | COMMUNITY
Start: 2020-11-10 | End: 2021-05-07

## 2021-05-10 DIAGNOSIS — L23.9 ALLERGIC CONTACT DERMATITIS, UNSPECIFIED CAUSE: ICD-10-CM

## 2021-05-10 NOTE — PHYSICAL EXAM
[Normal] : normal rate, regular rhythm, normal S1 and S2 and no murmur heard [de-identified] : Erythema and excoriations over the dorsal and palmar surfaces of the hands and over the lower part of the wrist in a glove distribution. Mild edema

## 2021-05-10 NOTE — HISTORY OF PRESENT ILLNESS
[[employer] ] : provided by [employer]   [FreeTextEntry8] : 46F presents for b/l hand rash\par -B/l rash of the hands, both sides, up to the wrists. 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 Notices the rash is worse after she cleans. Is uncertain what types of gloves she uses [FreeTextEntry1] : [id number]

## 2021-05-28 ENCOUNTER — OUTPATIENT (OUTPATIENT)
Dept: OUTPATIENT SERVICES | Facility: HOSPITAL | Age: 46
LOS: 1 days | End: 2021-05-28
Payer: SELF-PAY

## 2021-05-28 ENCOUNTER — APPOINTMENT (OUTPATIENT)
Dept: FAMILY MEDICINE | Facility: HOSPITAL | Age: 46
End: 2021-05-28

## 2021-05-28 VITALS
HEIGHT: 60 IN | RESPIRATION RATE: 14 BRPM | TEMPERATURE: 98.4 F | HEART RATE: 72 BPM | BODY MASS INDEX: 30.23 KG/M2 | WEIGHT: 154 LBS | SYSTOLIC BLOOD PRESSURE: 114 MMHG | OXYGEN SATURATION: 98 % | DIASTOLIC BLOOD PRESSURE: 75 MMHG

## 2021-05-28 DIAGNOSIS — Z00.00 ENCOUNTER FOR GENERAL ADULT MEDICAL EXAMINATION WITHOUT ABNORMAL FINDINGS: ICD-10-CM

## 2021-05-28 PROCEDURE — G0463: CPT

## 2021-05-28 RX ORDER — MELATONIN 3 MG
3 CAPSULE ORAL
Qty: 30 | Refills: 3 | Status: DISCONTINUED | COMMUNITY
Start: 2020-12-04 | End: 2021-05-28

## 2021-05-28 RX ORDER — FLUTICASONE PROPIONATE 50 UG/1
50 SPRAY, METERED NASAL DAILY
Qty: 1 | Refills: 3 | Status: DISCONTINUED | COMMUNITY
Start: 2020-12-04 | End: 2021-05-28

## 2021-05-28 RX ORDER — POLYETHYLENE GLYCOL 3350 17 G/17G
17 POWDER, FOR SOLUTION ORAL DAILY
Qty: 14 | Refills: 0 | Status: DISCONTINUED | COMMUNITY
Start: 2020-11-07 | End: 2021-05-28

## 2021-05-29 DIAGNOSIS — L23.9 ALLERGIC CONTACT DERMATITIS, UNSPECIFIED CAUSE: ICD-10-CM

## 2021-06-01 NOTE — HISTORY OF PRESENT ILLNESS
[[employer] ] : provided by [employer]   [FreeTextEntry1] : [id number] [TWNoteComboBox1] : Serbian [de-identified] : 47 yo female seen on 5/7 for pruritic hand dermatitis for which she was prescribed 2.5% hydrocortisone. Pt is being seen today for follow up. Pt reports noticing significant improvement in her hand rash and pruritus with the combined use of benadryl and topical hydrocortisone. At work also had change in type of gloves being used, now using latex-free gloves. Pt states rash returned however after running of out benadryl. Requesting for refill of benadryl.

## 2021-06-01 NOTE — REVIEW OF SYSTEMS
[Itching] : Itching [Skin Rash] : skin rash [Negative] : Gastrointestinal [Fever] : no fever [Chills] : no chills

## 2021-06-01 NOTE — PHYSICAL EXAM
[No Acute Distress] : no acute distress [Well-Appearing] : well-appearing [No Respiratory Distress] : no respiratory distress  [Clear to Auscultation] : lungs were clear to auscultation bilaterally [Normal Rate] : normal rate  [Regular Rhythm] : with a regular rhythm [Normal S1, S2] : normal S1 and S2 [No Murmur] : no murmur heard [de-identified] : rough, eczematous -like, patchy areas with mild erythema of the dorsum and palmar surfaces of b/l hands

## 2021-06-04 ENCOUNTER — APPOINTMENT (OUTPATIENT)
Dept: FAMILY MEDICINE | Facility: HOSPITAL | Age: 46
End: 2021-06-04

## 2021-06-04 ENCOUNTER — OUTPATIENT (OUTPATIENT)
Dept: OUTPATIENT SERVICES | Facility: HOSPITAL | Age: 46
LOS: 1 days | End: 2021-06-04
Payer: SELF-PAY

## 2021-06-04 ENCOUNTER — RESULT REVIEW (OUTPATIENT)
Age: 46
End: 2021-06-04

## 2021-06-04 VITALS
RESPIRATION RATE: 14 BRPM | BODY MASS INDEX: 30.08 KG/M2 | WEIGHT: 154 LBS | TEMPERATURE: 97 F | DIASTOLIC BLOOD PRESSURE: 74 MMHG | SYSTOLIC BLOOD PRESSURE: 122 MMHG | OXYGEN SATURATION: 94 % | HEART RATE: 76 BPM

## 2021-06-04 DIAGNOSIS — L23.9 ALLERGIC CONTACT DERMATITIS, UNSPECIFIED CAUSE: ICD-10-CM

## 2021-06-04 DIAGNOSIS — Z00.00 ENCOUNTER FOR GENERAL ADULT MEDICAL EXAMINATION WITHOUT ABNORMAL FINDINGS: ICD-10-CM

## 2021-06-04 PROCEDURE — G0463: CPT

## 2021-06-04 NOTE — PROCEDURE
[FreeTextEntry1] : Moderna Vaccine #2 [FreeTextEntry2] : COVID Vaccination  [FreeTextEntry3] : \par 46F with a PMH significant for BIRADS 1 on Jessica and Contact dermatitis, presenting to clinic 4 weeks s/p her initial Moderna COVID Vaccination. Pt states that apart from mild Myalgias in the injected Upper extremity, the vaccination performed thagt day was well tolerated. She returns to clinic today to complete her vaccination series. On further ROS She denies Chest Pain, SOB/MARINA, abdominal pain, N/V/D, Fevers/Chills, HA or blurred vision with remaining ROS unremarkable.\par \par \par

## 2021-06-04 NOTE — ASSESSMENT
[FreeTextEntry1] : ### presenting to clinic 4 weeks s/p her initial Modernqa COVID Vaccine to complete the series with her second dose, with the vaccine well tolerated and no adverse reactions observed during the 15min wait period\par \par \par \par #COVID-19 Precautions \par -Pt assessed for Eligibility for COVID Vaccine  \par -Pt is 4 weeks s/p initial Moderna Vaccine \par -2nd dose of Moderna given today without adverse reactions\par -Practice Social Distancing \par -Practice hand hygiene \par -Continue with surgical Mask PPE \par -Tylenol for pain management \par -Contact clinic with concerning sxs; SOB, Chest pain etc \par \par #BIRADS 1 \par -Mammo performed a few years prior revealed BIRADS 1 \par -Recc f/u q annual Diagnostic Mammo \par -Referal [Provided today\par -Will contact pt with results\par \par #Contact Dermatitis\par -Sxs resolving with Topical Hydrocortisone and Alovera\par -Continue with above regimen \par -Refill sent to Pharmacy \par -RTC in 8weeks for re-evaluation of  sxs to the above regimen\par \par \par #Future \par -RTC in 2 months for f/u dermatiits \par \par \par Case discussed with Dr. Hahn \par  \par

## 2021-06-07 DIAGNOSIS — R92.2 INCONCLUSIVE MAMMOGRAM: ICD-10-CM

## 2021-06-07 DIAGNOSIS — L23.9 ALLERGIC CONTACT DERMATITIS, UNSPECIFIED CAUSE: ICD-10-CM

## 2021-06-07 DIAGNOSIS — L30.9 DERMATITIS, UNSPECIFIED: ICD-10-CM

## 2021-06-26 ENCOUNTER — OUTPATIENT (OUTPATIENT)
Dept: OUTPATIENT SERVICES | Facility: HOSPITAL | Age: 46
LOS: 1 days | End: 2021-06-26
Payer: SELF-PAY

## 2021-06-26 ENCOUNTER — APPOINTMENT (OUTPATIENT)
Dept: FAMILY MEDICINE | Facility: HOSPITAL | Age: 46
End: 2021-06-26

## 2021-06-26 VITALS
SYSTOLIC BLOOD PRESSURE: 99 MMHG | DIASTOLIC BLOOD PRESSURE: 68 MMHG | WEIGHT: 156 LBS | HEART RATE: 64 BPM | TEMPERATURE: 96.8 F | BODY MASS INDEX: 30.47 KG/M2 | OXYGEN SATURATION: 98 % | RESPIRATION RATE: 17 BRPM

## 2021-06-26 DIAGNOSIS — Z00.00 ENCOUNTER FOR GENERAL ADULT MEDICAL EXAMINATION WITHOUT ABNORMAL FINDINGS: ICD-10-CM

## 2021-06-26 DIAGNOSIS — Z00.00 ENCOUNTER FOR GENERAL ADULT MEDICAL EXAMINATION W/OUT ABNORMAL FINDINGS: ICD-10-CM

## 2021-06-26 DIAGNOSIS — B35.1 TINEA UNGUIUM: ICD-10-CM

## 2021-06-26 PROCEDURE — 86780 TREPONEMA PALLIDUM: CPT

## 2021-06-26 PROCEDURE — G0463: CPT

## 2021-06-26 PROCEDURE — 84443 ASSAY THYROID STIM HORMONE: CPT

## 2021-06-26 PROCEDURE — 82306 VITAMIN D 25 HYDROXY: CPT

## 2021-06-26 PROCEDURE — 87389 HIV-1 AG W/HIV-1&-2 AB AG IA: CPT

## 2021-06-26 PROCEDURE — 85025 COMPLETE CBC W/AUTO DIFF WBC: CPT

## 2021-06-26 PROCEDURE — 80061 LIPID PANEL: CPT

## 2021-06-26 PROCEDURE — 80053 COMPREHEN METABOLIC PANEL: CPT

## 2021-06-26 PROCEDURE — 83036 HEMOGLOBIN GLYCOSYLATED A1C: CPT

## 2021-06-28 NOTE — ASSESSMENT
[FreeTextEntry1] : \par \par 46F with a PMH significant for Seasonal Allergies with Contact Dermatitis and Chronic LBP who presents to clinic today for her annual CPE with complaints of progressive toenail hyperpigmentation likely in the setting of Onychomycosis. \par \par \par #Onychomycosis \par -F/u Fungal Cx of effected toenail \par -Initiate Treatment with topical Antifungal \par -Clotrimazole 1% Topical Cream, to be applied 2-3 times daily sparingly \par -Most recent CMP with Hepatic Enzymes within normal limits; \par -RTC in 2 weeks for re-evaluation of sxs to the above regimen\par -Consider treatment with PO Antifungals in refractory cases; Terbinafine, Itraconazole, fluconazole\par -Consider referral to Podiatry in severe cases or cases refractory to above regimen \par \par  \par \par #Chronic  LBP  \par -Currently Sxs on going for over 3 months, with no sxs c/f spinal compression \par -Likely in the setting of Lumbosacral Strain from repetitive over use \par -Continue with Conservative Management Warm Compresses to the effected area for 20min qhours\par -OTC Analgesics for Pain management \par -Adv Regimen to PO Naprosyn in Refractory Cases; 500mg BID PRN x 10days\par -Consider repeat referral to PT with no improvement on conservative management  \par -RTC in with s/sx concerning for progressive disease: Fevers, Changes in Bowel/Urinary habits, Paresthesias\par -Consider Lumbar XR vs MRI should Concerning Sxs present results\par \par \par #COVID-19 Precautions \par -Pt assessed for Eligibility for COVID Vaccine  \par -S/p completed Moderna Vaccine Series \par -Practice Social Distancing \par -Practice hand hygiene \par -Continue with surgical Mask PPE \par -Contact the clinic with Sxs concerning for COVID \par \par #Health Care Maintenance \par -Scheduled an appointment for annual CPE 5/26/21\par - F/u results for CBC, CMP, A1C, TSH w/ reflex T4, Lipid Panel, VItamin D\par -Flu shot Deferred\par -STD testing to be Offered at follow up visit: HIV, Hep B, Syphilis, GC/Chlam \par -Mammo referral provided on 6/4/21 for history of Dense Breast, f/u results \par \par \par \par #CSP \par -CSP Eligible: No \par -Currently 4 years Below Age Limit\par \par \par #Future \par -Contact pt with above results \par -RTC in 2 weeks for routine follow up with PCP, where the following issues may be addressed: re-evaluation of Onychomycosis to the above regimen\par \par \par \par \par Case discussed with Dr. Hahn \par

## 2021-06-28 NOTE — HISTORY OF PRESENT ILLNESS
[FreeTextEntry1] : CPE  [de-identified] : \par \par 46F with a PMH significant for Chronic LBP and Dermatitis; she was last seen in clinic on 5/28/21 for an acute evaluation of a recurrent rash thought to be an exacerbation of her Contact Dermatiits, for which she was sent home on Conservative management with topical steroids and PO Benadryl. She returns to clinic today to complete her annual CPE. \par \par  Today she endorses a 1 month history of progressive hyperpigmentation of her Right greater toenail bed, She dates the onset as over a month prior, however over the course of a few weeks she began experiencing mild discomfort in the area. Pt reports a similar episode in the past, easily treated with a topical antifungal and feels that this acute change in her of nail may be a similar recurrence. Apart from the above she denies any other acute complaints and states she is actively employed as a  and denies any life stressors at the moment. She endorses resolution of her dermatitic rash with her current regimen and denies any recent recurrence. On further ROS, she denies Chest Pain, Fevers/chills/rigors, SOB/MARINA, N/V/D with remaining ROS unremarkable.\par \par \par \par

## 2021-06-28 NOTE — REVIEW OF SYSTEMS
[Nail Changes] : nail changes [Negative] : Heme/Lymph [Itching] : no itching [Mole Changes] : no mole changes [Hair Changes] : no hair changes [Skin Rash] : no skin rash

## 2021-07-02 ENCOUNTER — OUTPATIENT (OUTPATIENT)
Dept: OUTPATIENT SERVICES | Facility: HOSPITAL | Age: 46
LOS: 1 days | End: 2021-07-02

## 2021-07-02 DIAGNOSIS — Z00.00 ENCOUNTER FOR GENERAL ADULT MEDICAL EXAMINATION WITHOUT ABNORMAL FINDINGS: ICD-10-CM

## 2021-08-14 ENCOUNTER — NON-APPOINTMENT (OUTPATIENT)
Age: 46
End: 2021-08-14

## 2021-08-14 LAB
25(OH)D3 SERPL-MCNC: 10.5 NG/ML
ALBUMIN SERPL ELPH-MCNC: 4.1 G/DL
ALP BLD-CCNC: 74 U/L
ALT SERPL-CCNC: 16 U/L
ANION GAP SERPL CALC-SCNC: 10 MMOL/L
AST SERPL-CCNC: 15 U/L
BASOPHILS # BLD AUTO: 0.04 K/UL
BASOPHILS NFR BLD AUTO: 0.7 %
BILIRUB SERPL-MCNC: 0.3 MG/DL
BUN SERPL-MCNC: 11 MG/DL
CALCIUM SERPL-MCNC: 9.3 MG/DL
CHLORIDE SERPL-SCNC: 107 MMOL/L
CHOLEST SERPL-MCNC: 207 MG/DL
CO2 SERPL-SCNC: 23 MMOL/L
CREAT SERPL-MCNC: 0.74 MG/DL
EOSINOPHIL # BLD AUTO: 0.12 K/UL
EOSINOPHIL NFR BLD AUTO: 2.2 %
ESTIMATED AVERAGE GLUCOSE: 117 MG/DL
GLUCOSE SERPL-MCNC: 90 MG/DL
HBA1C MFR BLD HPLC: 5.7 %
HCT VFR BLD CALC: 36 %
HDLC SERPL-MCNC: 45 MG/DL
HGB BLD-MCNC: 11.2 G/DL
HIV1+2 AB SPEC QL IA.RAPID: NONREACTIVE
IMM GRANULOCYTES NFR BLD AUTO: 0.2 %
LDLC SERPL CALC-MCNC: 137 MG/DL
LYMPHOCYTES # BLD AUTO: 1.98 K/UL
LYMPHOCYTES NFR BLD AUTO: 36.9 %
MAN DIFF?: NORMAL
MCHC RBC-ENTMCNC: 27.7 PG
MCHC RBC-ENTMCNC: 31.1 GM/DL
MCV RBC AUTO: 88.9 FL
MONOCYTES # BLD AUTO: 0.44 K/UL
MONOCYTES NFR BLD AUTO: 8.2 %
NEUTROPHILS # BLD AUTO: 2.78 K/UL
NEUTROPHILS NFR BLD AUTO: 51.8 %
NONHDLC SERPL-MCNC: 162 MG/DL
PLATELET # BLD AUTO: 351 K/UL
POTASSIUM SERPL-SCNC: 4.1 MMOL/L
PROT SERPL-MCNC: 7.2 G/DL
RBC # BLD: 4.05 M/UL
RBC # FLD: 17.9 %
SODIUM SERPL-SCNC: 140 MMOL/L
T PALLIDUM AB SER QL IA: NEGATIVE
TRIGL SERPL-MCNC: 128 MG/DL
TSH SERPL-ACNC: 0.74 UIU/ML
WBC # FLD AUTO: 5.37 K/UL

## 2021-08-27 ENCOUNTER — APPOINTMENT (OUTPATIENT)
Dept: FAMILY MEDICINE | Facility: HOSPITAL | Age: 46
End: 2021-08-27

## 2021-08-27 ENCOUNTER — OUTPATIENT (OUTPATIENT)
Dept: OUTPATIENT SERVICES | Facility: HOSPITAL | Age: 46
LOS: 1 days | End: 2021-08-27
Payer: SELF-PAY

## 2021-08-27 VITALS
SYSTOLIC BLOOD PRESSURE: 107 MMHG | HEART RATE: 71 BPM | BODY MASS INDEX: 30.08 KG/M2 | WEIGHT: 154 LBS | RESPIRATION RATE: 15 BRPM | DIASTOLIC BLOOD PRESSURE: 73 MMHG | OXYGEN SATURATION: 96 %

## 2021-08-27 DIAGNOSIS — E55.9 VITAMIN D DEFICIENCY, UNSPECIFIED: ICD-10-CM

## 2021-08-27 DIAGNOSIS — Z00.00 ENCOUNTER FOR GENERAL ADULT MEDICAL EXAMINATION WITHOUT ABNORMAL FINDINGS: ICD-10-CM

## 2021-08-27 PROCEDURE — G0463: CPT

## 2021-08-27 RX ORDER — CAMPHOR 0.45 %
25 GEL (GRAM) TOPICAL
Qty: 30 | Refills: 0 | Status: DISCONTINUED | COMMUNITY
Start: 2021-05-07 | End: 2021-08-27

## 2021-08-27 RX ORDER — HYDROCORTISONE 25 MG/G
2.5 CREAM TOPICAL
Qty: 1 | Refills: 0 | Status: DISCONTINUED | COMMUNITY
Start: 2021-05-07 | End: 2021-08-27

## 2021-08-27 RX ORDER — CHOLECALCIFEROL (VITAMIN D3) 1250 MCG
1.25 MG CAPSULE ORAL
Qty: 16 | Refills: 0 | Status: ACTIVE | COMMUNITY
Start: 2021-08-27 | End: 1900-01-01

## 2021-08-27 RX ORDER — LORATADINE 10 MG/1
10 TABLET ORAL
Qty: 30 | Refills: 3 | Status: DISCONTINUED | COMMUNITY
Start: 2020-12-04 | End: 2021-08-27

## 2021-08-29 DIAGNOSIS — E55.9 VITAMIN D DEFICIENCY, UNSPECIFIED: ICD-10-CM

## 2021-08-29 DIAGNOSIS — N85.00 ENDOMETRIAL HYPERPLASIA, UNSPECIFIED: ICD-10-CM

## 2021-08-29 DIAGNOSIS — L23.9 ALLERGIC CONTACT DERMATITIS, UNSPECIFIED CAUSE: ICD-10-CM

## 2021-08-29 DIAGNOSIS — N35.12: ICD-10-CM

## 2021-08-30 NOTE — ASSESSMENT
[FreeTextEntry1] : \par \par 46F with a PMH significant for Seasonal Allergies, Contact Dermatitis and Chronic LBP who presents to clinic today for routine follow up endorsing complaints of progressive dermatitic rash thought to be in the setting of contact dermatitis. 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\par -Advised to avoid contact \par -Letter provided for employer stating the above \par -Reinstated topical steroids\par -RTC in 4 weeks for re-evaluation of sxs to the above regimen\par \par \par #Onychomycosis \par -Treated based on clinical appearance , appears to have resolved \par -S/p Treatment with topical Antifungal, Clotrimazole 1% Topical Cream, to be applied 2-3 times daily sparingly \par -Most recent CMP with Hepatic Enzymes within normal limits; drawn on 6/26/21\par -Consider treatment with PO Antifungals in refractory cases; Terbinafine, Itraconazole, fluconazole\par -Consider referral to Podiatry in severe cases or cases refractory to above regimen \par \par  \par \par #Chronic  LBP  \par -Resolved\par -Likely in the setting of Lumbosacral Strain from repetitive over use \par -Continue with Conservative Management Warm Compresses to the effected area for 20min qhours\par -OTC Analgesics for Pain management \par -Adv Regimen to PO Naprosyn in Refractory Cases; 500mg BID PRN x 10days\par -Consider repeat referral to PT with no improvement on conservative management  \par -Consider Lumbar XR vs MRI should Concerning Sxs present results\par \par \par #COVID-19 Precautions \par -Pt assessed for Eligibility for COVID Vaccine  \par -S/p completed Moderna Vaccine Series on 6/4/21\par -Practice Social Distancing \par -Practice hand hygiene \par -Continue with surgical Mask PPE \par -Contact the clinic with Sxs concerning for COVID \par \par \par #Health Care Maintenance \par -Scheduled an appointment for annual CPE 5/26/21\par -Reviewed results for CBC, CMP, A1C, TSH w/ reflex T4, Lipid Panel, VItamin D\par -Flu shot administered 10/31/20\par -Mammo referral provided on 6/4/21 for history of Dense Breast, f/u results \par \par \par \par \par #Future \par -RTC in 1 month for re-evaluation of sxs to the above regimen\par \par \par \par \par \par Case discussed with Dr. Hahn \par

## 2021-08-30 NOTE — REVIEW OF SYSTEMS
[Nail Changes] : nail changes [Negative] : Gastrointestinal [Itching] : no itching [Mole Changes] : no mole changes [Hair Changes] : no hair changes [Skin Rash] : no skin rash

## 2021-08-30 NOTE — HISTORY OF PRESENT ILLNESS
[FreeTextEntry1] : Routine Follow Up Visit s/p CPE [de-identified] : \par \par 46F with a PMH significant for Chronic LBP and Dermatitis; she was last seen in clinic a month prior for her annual CPE as well as for evaluation of a recurrent rash thought to be an exacerbation of her Contact Dermatitis, for which she was sent home on Conservative management with topical steroids and PO Benadryl. She returns to clinic today for routine follow up. \par \par  Today she endorses a 1 month history of persistence of the hyperpigmentation of her Right greater toenail bed. She dates the onset as over 2 months prior, and states that although she is not experiencing mild discomfort in the area the hyperpigmentation still persists. She endorses persitaence of the above despite compliance to her topical antifungals. 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 On further ROS, she denies Chest Pain, Fevers/chills/rigors, SOB/MARINA, N/V/D with remaining ROS unremarkable.\par \par \par \par

## 2021-09-13 ENCOUNTER — APPOINTMENT (OUTPATIENT)
Dept: FAMILY MEDICINE | Facility: HOSPITAL | Age: 46
End: 2021-09-13

## 2021-09-24 ENCOUNTER — APPOINTMENT (OUTPATIENT)
Dept: FAMILY MEDICINE | Facility: HOSPITAL | Age: 46
End: 2021-09-24

## 2021-09-24 ENCOUNTER — OUTPATIENT (OUTPATIENT)
Dept: OUTPATIENT SERVICES | Facility: HOSPITAL | Age: 46
LOS: 1 days | End: 2021-09-24
Payer: SELF-PAY

## 2021-09-24 VITALS
WEIGHT: 154.2 LBS | SYSTOLIC BLOOD PRESSURE: 129 MMHG | DIASTOLIC BLOOD PRESSURE: 86 MMHG | BODY MASS INDEX: 30.12 KG/M2 | OXYGEN SATURATION: 100 % | TEMPERATURE: 97 F | HEART RATE: 72 BPM | RESPIRATION RATE: 15 BRPM

## 2021-09-24 DIAGNOSIS — Z00.00 ENCOUNTER FOR GENERAL ADULT MEDICAL EXAMINATION WITHOUT ABNORMAL FINDINGS: ICD-10-CM

## 2021-09-24 DIAGNOSIS — B37.3 CANDIDIASIS OF VULVA AND VAGINA: ICD-10-CM

## 2021-09-24 PROCEDURE — 87800 DETECT AGNT MULT DNA DIREC: CPT

## 2021-09-24 PROCEDURE — 87186 SC STD MICRODIL/AGAR DIL: CPT

## 2021-09-24 PROCEDURE — 87086 URINE CULTURE/COLONY COUNT: CPT

## 2021-09-24 PROCEDURE — G0463: CPT

## 2021-09-24 PROCEDURE — 87077 CULTURE AEROBIC IDENTIFY: CPT

## 2021-09-24 NOTE — REVIEW OF SYSTEMS
[Dysuria] : dysuria [Incontinence] : no incontinence [Hematuria] : no hematuria [Frequency] : frequency [Vaginal Discharge] : vaginal discharge [Negative] : Gastrointestinal

## 2021-09-24 NOTE — HISTORY OF PRESENT ILLNESS
[FreeTextEntry8] : Pt is a 47 yo F presenting for dysuria, increased urinary frequency and urgency. Pt also notes yellowish vaginal D/C, all onset 1 week ago. \par -As of 2 months ago, pt notes intermenstrual bleeding, occurring q 2 weeks and lasts about 5 days, starting as light spotting w 1 day of heavy bleeding. Pt has previously experienced hot flashes and labile mood over the past 2 years. States mother entered menopause at age 48. \par -Denies fevers,chills, flank pain or hx of STI. Pt not currently sexually active.

## 2021-09-24 NOTE — PHYSICAL EXAM
[Normal] : soft, non-tender, non-distended, no masses palpated, no HSM and normal bowel sounds [Urethral Meatus] : normal urethra [Vagina] : normal vaginal exam [Urinary Bladder Findings] : the bladder was normal on palpation [Cervix] : normal cervix [FreeTextEntry1] : Scant amount of blood observed on cervical os.

## 2021-09-26 ENCOUNTER — NON-APPOINTMENT (OUTPATIENT)
Age: 46
End: 2021-09-26

## 2021-09-26 PROBLEM — B37.3 CANDIDA VAGINITIS: Status: RESOLVED | Noted: 2021-09-26 | Resolved: 2021-10-26

## 2021-09-26 LAB
CANDIDA VAG CYTO: DETECTED
G VAGINALIS+PREV SP MTYP VAG QL MICRO: NOT DETECTED
T VAGINALIS VAG QL WET PREP: NOT DETECTED

## 2021-09-27 DIAGNOSIS — R30.0 DYSURIA: ICD-10-CM

## 2021-09-27 DIAGNOSIS — N89.8 OTHER SPECIFIED NONINFLAMMATORY DISORDERS OF VAGINA: ICD-10-CM

## 2021-09-28 DIAGNOSIS — Z16.12 BACTERIAL INFECTION, UNSPECIFIED: ICD-10-CM

## 2021-09-28 DIAGNOSIS — A49.9 BACTERIAL INFECTION, UNSPECIFIED: ICD-10-CM

## 2021-09-28 LAB — BACTERIA UR CULT: ABNORMAL

## 2021-09-28 RX ORDER — SULFAMETHOXAZOLE AND TRIMETHOPRIM 800; 160 MG/1; MG/1
800-160 TABLET ORAL
Qty: 6 | Refills: 0 | Status: DISCONTINUED | COMMUNITY
Start: 2021-09-24 | End: 2021-09-28

## 2021-10-29 ENCOUNTER — APPOINTMENT (OUTPATIENT)
Dept: FAMILY MEDICINE | Facility: HOSPITAL | Age: 46
End: 2021-10-29

## 2021-12-17 ENCOUNTER — RESULT REVIEW (OUTPATIENT)
Age: 46
End: 2021-12-17

## 2021-12-17 ENCOUNTER — APPOINTMENT (OUTPATIENT)
Dept: MAMMOGRAPHY | Facility: HOSPITAL | Age: 46
End: 2021-12-17
Payer: COMMERCIAL

## 2021-12-17 ENCOUNTER — OUTPATIENT (OUTPATIENT)
Dept: OUTPATIENT SERVICES | Facility: HOSPITAL | Age: 46
LOS: 1 days | End: 2021-12-17
Payer: SELF-PAY

## 2021-12-17 ENCOUNTER — APPOINTMENT (OUTPATIENT)
Dept: ULTRASOUND IMAGING | Facility: HOSPITAL | Age: 46
End: 2021-12-17
Payer: COMMERCIAL

## 2021-12-17 DIAGNOSIS — Z00.8 ENCOUNTER FOR OTHER GENERAL EXAMINATION: ICD-10-CM

## 2021-12-17 PROCEDURE — 76830 TRANSVAGINAL US NON-OB: CPT

## 2021-12-17 PROCEDURE — 77067 SCR MAMMO BI INCL CAD: CPT | Mod: 26

## 2021-12-17 PROCEDURE — 77063 BREAST TOMOSYNTHESIS BI: CPT

## 2021-12-17 PROCEDURE — 76856 US EXAM PELVIC COMPLETE: CPT | Mod: 26

## 2021-12-17 PROCEDURE — 77067 SCR MAMMO BI INCL CAD: CPT

## 2021-12-17 PROCEDURE — 76830 TRANSVAGINAL US NON-OB: CPT | Mod: 26

## 2021-12-17 PROCEDURE — 77063 BREAST TOMOSYNTHESIS BI: CPT | Mod: 26

## 2021-12-17 PROCEDURE — 76856 US EXAM PELVIC COMPLETE: CPT

## 2022-01-13 ENCOUNTER — NON-APPOINTMENT (OUTPATIENT)
Age: 47
End: 2022-01-13

## 2022-01-13 ENCOUNTER — RESULT REVIEW (OUTPATIENT)
Age: 47
End: 2022-01-13

## 2022-01-13 ENCOUNTER — APPOINTMENT (OUTPATIENT)
Dept: ULTRASOUND IMAGING | Facility: HOSPITAL | Age: 47
End: 2022-01-13
Payer: COMMERCIAL

## 2022-01-13 ENCOUNTER — APPOINTMENT (OUTPATIENT)
Dept: MAMMOGRAPHY | Facility: HOSPITAL | Age: 47
End: 2022-01-13
Payer: COMMERCIAL

## 2022-01-13 ENCOUNTER — OUTPATIENT (OUTPATIENT)
Dept: OUTPATIENT SERVICES | Facility: HOSPITAL | Age: 47
LOS: 1 days | End: 2022-01-13

## 2022-01-13 ENCOUNTER — OUTPATIENT (OUTPATIENT)
Dept: OUTPATIENT SERVICES | Facility: HOSPITAL | Age: 47
LOS: 1 days | End: 2022-01-13
Payer: SELF-PAY

## 2022-01-13 DIAGNOSIS — Z00.8 ENCOUNTER FOR OTHER GENERAL EXAMINATION: ICD-10-CM

## 2022-01-13 DIAGNOSIS — R92.2 INCONCLUSIVE MAMMOGRAM: ICD-10-CM

## 2022-01-13 PROCEDURE — 76641 ULTRASOUND BREAST COMPLETE: CPT | Mod: 26,LT

## 2022-01-13 PROCEDURE — G0279: CPT | Mod: 26

## 2022-01-13 PROCEDURE — 77065 DX MAMMO INCL CAD UNI: CPT | Mod: 26,LT

## 2022-01-13 PROCEDURE — 77065 DX MAMMO INCL CAD UNI: CPT

## 2022-01-13 PROCEDURE — G0279: CPT

## 2022-01-13 PROCEDURE — 76641 ULTRASOUND BREAST COMPLETE: CPT

## 2022-02-01 ENCOUNTER — OUTPATIENT (OUTPATIENT)
Dept: OUTPATIENT SERVICES | Facility: HOSPITAL | Age: 47
LOS: 1 days | End: 2022-02-01
Payer: SELF-PAY

## 2022-02-01 ENCOUNTER — APPOINTMENT (OUTPATIENT)
Dept: FAMILY MEDICINE | Facility: HOSPITAL | Age: 47
End: 2022-02-01

## 2022-02-01 VITALS
BODY MASS INDEX: 30.43 KG/M2 | TEMPERATURE: 96.9 F | WEIGHT: 155 LBS | DIASTOLIC BLOOD PRESSURE: 65 MMHG | OXYGEN SATURATION: 98 % | RESPIRATION RATE: 15 BRPM | HEIGHT: 60 IN | HEART RATE: 75 BPM | SYSTOLIC BLOOD PRESSURE: 98 MMHG

## 2022-02-01 DIAGNOSIS — Z00.00 ENCOUNTER FOR GENERAL ADULT MEDICAL EXAMINATION WITHOUT ABNORMAL FINDINGS: ICD-10-CM

## 2022-02-01 DIAGNOSIS — R92.2 INCONCLUSIVE MAMMOGRAM: ICD-10-CM

## 2022-02-01 PROCEDURE — 82274 ASSAY TEST FOR BLOOD FECAL: CPT

## 2022-02-01 PROCEDURE — G0463: CPT

## 2022-02-01 NOTE — HISTORY OF PRESENT ILLNESS
[FreeTextEntry1] : F/U [de-identified] : 45 yo F presenting for F/U of abnormal Mammo. \par -Pt underwent Routine Screening Mammo on 12/20 which demonstrated focal area of asymmetric density is noted in the left slightly lower inner quadrant middle to posterior depth for which further evaluation with spot compression imaging and possible left breast ultrasound examination is recommended. F/U US demonstrated no evidence of malignancy, F/U in 1 year recommended. \par -Also discussed intermenstrual bleeding. Pt clarified that she is having her menstrual cycles twice a month, on the 1st and towards the end, around the 25th. Clarified that this would still be considered normal since its more than 21 days between cycles. Also pt is perimenopausal. Reviewed pelvic US demonstrating 1.6 cm L ovarian cyst/follicle.

## 2022-02-02 DIAGNOSIS — N92.3 OVULATION BLEEDING: ICD-10-CM

## 2022-02-02 DIAGNOSIS — Z12.11 ENCOUNTER FOR SCREENING FOR MALIGNANT NEOPLASM OF COLON: ICD-10-CM

## 2022-02-02 DIAGNOSIS — R92.2 INCONCLUSIVE MAMMOGRAM: ICD-10-CM

## 2022-02-13 LAB — HEMOCCULT STL QL IA: NEGATIVE

## 2023-01-27 ENCOUNTER — OUTPATIENT (OUTPATIENT)
Dept: OUTPATIENT SERVICES | Facility: HOSPITAL | Age: 48
LOS: 1 days | End: 2023-01-27
Payer: SELF-PAY

## 2023-01-27 ENCOUNTER — OUTPATIENT (OUTPATIENT)
Dept: OUTPATIENT SERVICES | Facility: HOSPITAL | Age: 48
LOS: 1 days | End: 2023-01-27
Payer: COMMERCIAL

## 2023-01-27 ENCOUNTER — MED ADMIN CHARGE (OUTPATIENT)
Age: 48
End: 2023-01-27

## 2023-01-27 ENCOUNTER — APPOINTMENT (OUTPATIENT)
Dept: FAMILY MEDICINE | Facility: HOSPITAL | Age: 48
End: 2023-01-27

## 2023-01-27 VITALS
DIASTOLIC BLOOD PRESSURE: 76 MMHG | WEIGHT: 160 LBS | BODY MASS INDEX: 31.25 KG/M2 | HEART RATE: 78 BPM | SYSTOLIC BLOOD PRESSURE: 125 MMHG | TEMPERATURE: 97.6 F | RESPIRATION RATE: 14 BRPM | OXYGEN SATURATION: 99 %

## 2023-01-27 DIAGNOSIS — Z12.39 ENCOUNTER FOR OTHER SCREENING FOR MALIGNANT NEOPLASM OF BREAST: ICD-10-CM

## 2023-01-27 DIAGNOSIS — Z87.42 PERSONAL HISTORY OF OTHER DISEASES OF THE FEMALE GENITAL TRACT: ICD-10-CM

## 2023-01-27 DIAGNOSIS — Z87.898 PERSONAL HISTORY OF OTHER SPECIFIED CONDITIONS: ICD-10-CM

## 2023-01-27 DIAGNOSIS — Z12.11 ENCOUNTER FOR SCREENING FOR MALIGNANT NEOPLASM OF COLON: ICD-10-CM

## 2023-01-27 DIAGNOSIS — Z00.00 ENCOUNTER FOR GENERAL ADULT MEDICAL EXAMINATION WITHOUT ABNORMAL FINDINGS: ICD-10-CM

## 2023-01-27 DIAGNOSIS — Z87.39 PERSONAL HISTORY OF OTHER DISEASES OF THE MUSCULOSKELETAL SYSTEM AND CONNECTIVE TISSUE: ICD-10-CM

## 2023-01-27 DIAGNOSIS — N85.00 ENDOMETRIAL HYPERPLASIA, UNSPECIFIED: ICD-10-CM

## 2023-01-27 DIAGNOSIS — Z87.2 PERSONAL HISTORY OF DISEASES OF THE SKIN AND SUBCUTANEOUS TISSUE: ICD-10-CM

## 2023-01-27 DIAGNOSIS — M25.551 PAIN IN RIGHT HIP: ICD-10-CM

## 2023-01-27 DIAGNOSIS — Z23 ENCOUNTER FOR IMMUNIZATION: ICD-10-CM

## 2023-01-27 PROCEDURE — G0463: CPT

## 2023-01-29 PROBLEM — Z12.11 COLON CANCER SCREENING: Status: ACTIVE | Noted: 2023-01-27

## 2023-01-29 PROBLEM — M25.551 RIGHT HIP PAIN: Status: ACTIVE | Noted: 2023-01-27

## 2023-01-29 PROBLEM — Z12.39 BREAST CANCER SCREENING: Status: ACTIVE | Noted: 2023-01-27

## 2023-01-29 PROBLEM — Z23 ENCOUNTER FOR IMMUNIZATION: Status: RESOLVED | Noted: 2020-10-31 | Resolved: 2023-01-27

## 2023-01-29 RX ORDER — NITROFURANTOIN (MONOHYDRATE/MACROCRYSTALS) 25; 75 MG/1; MG/1
100 CAPSULE ORAL EVERY 6 HOURS
Qty: 20 | Refills: 0 | Status: DISCONTINUED | COMMUNITY
Start: 2021-09-28 | End: 2023-01-29

## 2023-01-29 RX ORDER — FLUCONAZOLE 150 MG/1
150 TABLET ORAL
Qty: 1 | Refills: 0 | Status: DISCONTINUED | COMMUNITY
Start: 2021-09-26 | End: 2023-01-29

## 2023-01-30 DIAGNOSIS — M25.551 PAIN IN RIGHT HIP: ICD-10-CM

## 2023-01-30 DIAGNOSIS — Z12.39 ENCOUNTER FOR OTHER SCREENING FOR MALIGNANT NEOPLASM OF BREAST: ICD-10-CM

## 2023-01-30 DIAGNOSIS — Z23 ENCOUNTER FOR IMMUNIZATION: ICD-10-CM

## 2023-01-30 DIAGNOSIS — Z12.11 ENCOUNTER FOR SCREENING FOR MALIGNANT NEOPLASM OF COLON: ICD-10-CM

## 2023-01-30 RX ORDER — CLOTRIMAZOLE 10 MG/G
1 CREAM TOPICAL 3 TIMES DAILY
Qty: 1 | Refills: 0 | Status: DISCONTINUED | COMMUNITY
Start: 2021-06-26 | End: 2023-01-30

## 2023-01-30 NOTE — HISTORY OF PRESENT ILLNESS
[FreeTextEntry8] : 48 yo F w PMH of obesity, HLD, pre-DM presenting to clinic for hip pain. \par -Pt states that onset 3 months ago, she started experiencing Right hip pain, worse w flexion of hip, feels when she waked up in the morning. Denies falls or traumatic event. Does not interfere w ADL's, does not need any assistive walking devices. Pt denies parasthesia, stool or urinary incontinence, fevers or chills.

## 2023-01-30 NOTE — PHYSICAL EXAM
[Normal] : no CVA or spinal tenderness [de-identified] : + Gaenslen test, thigh thrust test, gapping test, compression test, sacral thrust test on right side

## 2023-01-30 NOTE — PHYSICAL EXAM
[Normal] : no CVA or spinal tenderness [de-identified] : + Gaenslen test, thigh thrust test, gapping test, compression test, sacral thrust test on right side

## 2023-01-30 NOTE — HISTORY OF PRESENT ILLNESS
[FreeTextEntry8] : 46 yo F w PMH of obesity, HLD, pre-DM presenting to clinic for hip pain. \par -Pt states that onset 3 months ago, she started experiencing Right hip pain, worse w flexion of hip, feels when she waked up in the morning. Denies falls or traumatic event. Does not interfere w ADL's, does not need any assistive walking devices. Pt denies parasthesia, stool or urinary incontinence, fevers or chills.

## 2023-01-30 NOTE — REVIEW OF SYSTEMS
[Joint Pain] : joint pain [Negative] : Genitourinary [Joint Stiffness] : no joint stiffness [Joint Swelling] : no joint swelling [Muscle Weakness] : no muscle weakness [Muscle Pain] : no muscle pain [Back Pain] : no back pain

## 2023-06-12 ENCOUNTER — APPOINTMENT (OUTPATIENT)
Dept: FAMILY MEDICINE | Facility: HOSPITAL | Age: 48
End: 2023-06-12

## 2023-06-12 ENCOUNTER — OUTPATIENT (OUTPATIENT)
Dept: OUTPATIENT SERVICES | Facility: HOSPITAL | Age: 48
LOS: 1 days | End: 2023-06-12
Payer: SELF-PAY

## 2023-06-12 VITALS
TEMPERATURE: 98.2 F | SYSTOLIC BLOOD PRESSURE: 116 MMHG | HEART RATE: 82 BPM | DIASTOLIC BLOOD PRESSURE: 82 MMHG | OXYGEN SATURATION: 100 % | RESPIRATION RATE: 16 BRPM

## 2023-06-12 DIAGNOSIS — M79.7 FIBROMYALGIA: ICD-10-CM

## 2023-06-12 DIAGNOSIS — Z23 ENCOUNTER FOR IMMUNIZATION: ICD-10-CM

## 2023-06-12 DIAGNOSIS — E66.9 OBESITY, UNSPECIFIED: ICD-10-CM

## 2023-06-12 DIAGNOSIS — R52 PAIN, UNSPECIFIED: ICD-10-CM

## 2023-06-12 DIAGNOSIS — Z00.00 ENCOUNTER FOR GENERAL ADULT MEDICAL EXAMINATION WITHOUT ABNORMAL FINDINGS: ICD-10-CM

## 2023-06-12 DIAGNOSIS — E66.3 OVERWEIGHT: ICD-10-CM

## 2023-06-12 PROCEDURE — 86140 C-REACTIVE PROTEIN: CPT

## 2023-06-12 PROCEDURE — G0463: CPT

## 2023-06-12 PROCEDURE — 84443 ASSAY THYROID STIM HORMONE: CPT

## 2023-06-12 PROCEDURE — 80053 COMPREHEN METABOLIC PANEL: CPT

## 2023-06-12 PROCEDURE — 85652 RBC SED RATE AUTOMATED: CPT

## 2023-06-12 PROCEDURE — 85025 COMPLETE CBC W/AUTO DIFF WBC: CPT

## 2023-06-12 PROCEDURE — 83036 HEMOGLOBIN GLYCOSYLATED A1C: CPT

## 2023-06-12 PROCEDURE — 80061 LIPID PANEL: CPT

## 2023-06-12 RX ORDER — NAPROXEN 250 MG/1
250 TABLET ORAL 4 TIMES DAILY
Qty: 40 | Refills: 0 | Status: DISCONTINUED | COMMUNITY
Start: 2023-01-27 | End: 2023-06-12

## 2023-06-12 RX ORDER — IBUPROFEN 600 MG/1
600 TABLET, FILM COATED ORAL
Qty: 1 | Refills: 0 | Status: ACTIVE | COMMUNITY
Start: 2023-06-12 | End: 1900-01-01

## 2023-06-13 DIAGNOSIS — R52 PAIN, UNSPECIFIED: ICD-10-CM

## 2023-06-13 DIAGNOSIS — E66.9 OBESITY, UNSPECIFIED: ICD-10-CM

## 2023-06-13 DIAGNOSIS — M79.7 FIBROMYALGIA: ICD-10-CM

## 2023-06-13 NOTE — ASSESSMENT
[FreeTextEntry1] : *Above discussed w Dr. Hahn\par \par -RTC in 1 month for F/U widespread pain\par Due for CPE. If lost Mammo referral/FIT, will need to be rescheduled for CSP. \par

## 2023-06-13 NOTE — HISTORY OF PRESENT ILLNESS
[FreeTextEntry8] : 47 yo F w PMH of obesity, HLD, pre-DM presenting to clinic for right sided body pain. \par -States she's been having right sided body pain onset 6 months ago, states deep pain that radiates to her leg. Also now c/o right sided shoulder pain that radiates to her fingers associated w finger numbness. States pain will randomly come on, last 3 days then will slowly subside. States pain comes on strong and will return twice a week. Also notes increased fatigue and forgetfulness. LMP May 06 2023, states longer times between her menstrual cycles now. States her mother entered menopause at 50.  Pain improves w a hot shower, but pain still present. Denies anything makes pain worse. FiRST screening tool: 5. AAPT 2019 Diagnostic Criteria: Meets criteria Score of 8 plus sleep disturbance, fatigue and presence for 6 months.

## 2023-06-13 NOTE — REVIEW OF SYSTEMS
[Fatigue] : fatigue [Joint Pain] : joint pain [Joint Swelling] : joint swelling [Muscle Pain] : muscle pain [Negative] : Psychiatric [Fever] : no fever [Chills] : no chills [Hot Flashes] : no hot flashes [Night Sweats] : no night sweats [Recent Change In Weight] : ~T no recent weight change [Joint Stiffness] : no joint stiffness [Muscle Weakness] : no muscle weakness [Back Pain] : no back pain

## 2023-06-13 NOTE — PHYSICAL EXAM
[Grossly Normal Strength/Tone] : grossly normal strength/tone [Normal] : no rash [de-identified] : Pain w empty can test on right shoulder, pain w all ROM exercises for right leg. Straight leg test negative.

## 2023-06-14 ENCOUNTER — NON-APPOINTMENT (OUTPATIENT)
Age: 48
End: 2023-06-14

## 2023-06-14 LAB
ALBUMIN SERPL ELPH-MCNC: 4 G/DL
ALP BLD-CCNC: 83 U/L
ALT SERPL-CCNC: 22 U/L
ANION GAP SERPL CALC-SCNC: 8 MMOL/L
AST SERPL-CCNC: 15 U/L
BILIRUB SERPL-MCNC: 0.3 MG/DL
BUN SERPL-MCNC: 10 MG/DL
CALCIUM SERPL-MCNC: 9.3 MG/DL
CHLORIDE SERPL-SCNC: 107 MMOL/L
CHOLEST SERPL-MCNC: 217 MG/DL
CO2 SERPL-SCNC: 23 MMOL/L
CREAT SERPL-MCNC: 0.88 MG/DL
CRP SERPL-MCNC: <3 MG/L
EGFR: 81 ML/MIN/1.73M2
ERYTHROCYTE [SEDIMENTATION RATE] IN BLOOD BY WESTERGREN METHOD: 51 MM/HR
ESTIMATED AVERAGE GLUCOSE: 120 MG/DL
GLUCOSE SERPL-MCNC: 98 MG/DL
HBA1C MFR BLD HPLC: 5.8 %
HDLC SERPL-MCNC: 43 MG/DL
LDLC SERPL CALC-MCNC: 132 MG/DL
NONHDLC SERPL-MCNC: 174 MG/DL
POTASSIUM SERPL-SCNC: 4.3 MMOL/L
PROT SERPL-MCNC: 7 G/DL
SODIUM SERPL-SCNC: 138 MMOL/L
TRIGL SERPL-MCNC: 207 MG/DL
TSH SERPL-ACNC: 0.33 UIU/ML

## 2023-07-12 ENCOUNTER — APPOINTMENT (OUTPATIENT)
Dept: FAMILY MEDICINE | Facility: HOSPITAL | Age: 48
End: 2023-07-12

## 2025-06-04 ENCOUNTER — APPOINTMENT (OUTPATIENT)
Age: 50
End: 2025-06-04